# Patient Record
Sex: FEMALE | URBAN - METROPOLITAN AREA
[De-identification: names, ages, dates, MRNs, and addresses within clinical notes are randomized per-mention and may not be internally consistent; named-entity substitution may affect disease eponyms.]

---

## 2021-07-19 ENCOUNTER — OFFICE VISIT (OUTPATIENT)
Dept: URBAN - METROPOLITAN AREA CLINIC 56 | Facility: CLINIC | Age: 56
End: 2021-07-19
Payer: COMMERCIAL

## 2021-07-19 DIAGNOSIS — H04.123 DRY EYE SYNDROME OF BILATERAL LACRIMAL GLANDS: Primary | ICD-10-CM

## 2021-07-19 DIAGNOSIS — H25.13 AGE-RELATED NUCLEAR CATARACT, BILATERAL: ICD-10-CM

## 2021-07-19 DIAGNOSIS — H52.31 ANISOMETROPIA: ICD-10-CM

## 2021-07-19 PROCEDURE — 92004 COMPRE OPH EXAM NEW PT 1/>: CPT | Performed by: OPTOMETRIST

## 2021-07-19 ASSESSMENT — KERATOMETRY
OS: 43.44
OD: 43.49

## 2021-07-19 ASSESSMENT — INTRAOCULAR PRESSURE
OD: 17
OS: 16

## 2021-07-19 ASSESSMENT — VISUAL ACUITY
OS: 20/20
OD: 20/20

## 2021-07-19 NOTE — IMPRESSION/PLAN
Impression: Dry eye syndrome of bilateral lacrimal glands: H04.123. Plan: Explained chronic nature of condition- daily maintenance is needed and there is no cure. Start Artificial Tears QID OU. Start dry warm compresses BID for 5 minutes duration. Discussed discontinuing use of ceiling fan, recommend using a cool mist humidifier. Consider starting Restasis if no improvement. 
RTC in 1 year for CE

## 2021-07-19 NOTE — IMPRESSION/PLAN
Impression: Anisometropia: H52.31. Plan: Discussed with patient. Recommend Blue Blocker lenses while working on the computer. Can do near Rx for computer/near. Patient declines, she is happy with her natural monovision.

## 2024-10-28 ENCOUNTER — OFFICE VISIT (OUTPATIENT)
Dept: URBAN - METROPOLITAN AREA CLINIC 10 | Facility: CLINIC | Age: 59
End: 2024-10-28
Payer: COMMERCIAL

## 2024-10-28 DIAGNOSIS — H52.31 ANISOMETROPIA: ICD-10-CM

## 2024-10-28 DIAGNOSIS — H04.123 DRY EYE SYNDROME OF BILATERAL LACRIMAL GLANDS: ICD-10-CM

## 2024-10-28 DIAGNOSIS — H25.13 AGE-RELATED NUCLEAR CATARACT, BILATERAL: Primary | ICD-10-CM

## 2024-10-28 PROCEDURE — 99204 OFFICE O/P NEW MOD 45 MIN: CPT | Performed by: OPTOMETRIST

## 2024-10-28 PROCEDURE — 92134 CPTRZ OPH DX IMG PST SGM RTA: CPT | Performed by: OPTOMETRIST

## 2024-10-28 ASSESSMENT — VISUAL ACUITY
OD: 20/20
OS: 20/20

## 2024-10-28 ASSESSMENT — INTRAOCULAR PRESSURE
OS: 15
OD: 15